# Patient Record
Sex: FEMALE | Race: WHITE
[De-identification: names, ages, dates, MRNs, and addresses within clinical notes are randomized per-mention and may not be internally consistent; named-entity substitution may affect disease eponyms.]

---

## 2017-04-25 ENCOUNTER — HOSPITAL ENCOUNTER (EMERGENCY)
Dept: HOSPITAL 62 - ER | Age: 42
LOS: 1 days | Discharge: LEFT BEFORE DISCHARGE | End: 2017-04-26
Payer: SELF-PAY

## 2017-04-25 VITALS — DIASTOLIC BLOOD PRESSURE: 92 MMHG | SYSTOLIC BLOOD PRESSURE: 161 MMHG

## 2017-04-25 DIAGNOSIS — Z53.21: Primary | ICD-10-CM

## 2017-05-11 ENCOUNTER — HOSPITAL ENCOUNTER (EMERGENCY)
Dept: HOSPITAL 62 - ER | Age: 42
LOS: 1 days | Discharge: HOME | End: 2017-05-12
Payer: SELF-PAY

## 2017-05-11 DIAGNOSIS — M62.830: Primary | ICD-10-CM

## 2017-05-11 DIAGNOSIS — M54.9: ICD-10-CM

## 2017-05-11 DIAGNOSIS — M54.6: ICD-10-CM

## 2017-05-11 PROCEDURE — 99283 EMERGENCY DEPT VISIT LOW MDM: CPT

## 2017-05-12 VITALS — SYSTOLIC BLOOD PRESSURE: 148 MMHG | DIASTOLIC BLOOD PRESSURE: 91 MMHG

## 2017-05-12 NOTE — ER DOCUMENT REPORT
ED Neck/Back Problem





- General


Chief Complaint: Back Pain


Stated Complaint: BACK PAIN


Time Seen by Provider: 05/12/17 00:21


Mode of Arrival: Ambulatory


Information source: Patient


Notes: 


Patient is a 41-year-old  female who presents to the ER today for a 

painful lump on the left side of her upper back 1 month.  Patient states that 

it is very painful and that she can no longer leaning against anything without 

pain.  She has not tried anything for the pain.  She denies any numbness or 

tingling, pain over the middle of her back.  The pain does not radiate 

anywhere.  She's had no fever, chills, redness, drainage.


TRAVEL OUTSIDE OF THE [place] IN LAST 30 DAYS: No





- Related Data


Allergies/Adverse Reactions: 


 





dextromethorphan HBr [From NyQuil] Allergy (Verified 05/11/17 22:09)


 


doxylamine [From NyQuil] Allergy (Verified 05/11/17 22:09)


 


pseudoephedrine HCl [From NyQuil] Allergy (Verified 05/11/17 22:09)


 











Past Medical History





- General


Information source: Patient





- Social History


Smoking Status: Unknown if Ever Smoked


Family History: Reviewed & Not Pertinent, CAD, Malignancy


Patient has suicidal ideation: No


Patient has homicidal ideation: No


Neurological Medical History: Reports: Hx Migraine


Renal/ Medical History: Denies: Hx Peritoneal Dialysis


Psychiatric Medical History: Reports: Hx Bipolar Disorder, Hx Depression


Past Surgical History: Reports: Hx Tubal Ligation





- Immunizations


Immunizations up to date: Yes


Hx Diphtheria, Pertussis, Tetanus Vaccination: Yes


Hx Pneumococcal Vaccination: 10/01/10





Review of Systems





- Review of Systems


Constitutional: No symptoms reported


EENT: No symptoms reported


Cardiovascular: No symptoms reported


Respiratory: No symptoms reported


Gastrointestinal: No symptoms reported


Genitourinary: No symptoms reported


Female Genitourinary: No symptoms reported


Musculoskeletal: See HPI


Skin: See HPI


Hematologic/Lymphatic: No symptoms reported


Neurological/Psychological: No symptoms reported





Physical Exam





- Vital signs


Vitals: 





 











Temp Pulse Resp BP Pulse Ox


 


 97.8 F   73   16   142/92 H  98 


 


 05/11/17 22:10  05/11/17 22:10  05/11/17 22:10  05/11/17 22:10  05/11/17 22:10














- Notes


Notes: 


PHYSICAL EXAMINATION: 


GENERAL: Well-appearing and in no acute distress. 


HEAD: Atraumatic, normocephalic. 


EYES: Pupils equal round and reactive to light, extraocular movements intact, 

sclera anicteric, conjunctiva are normal. 


ENT: ear canals without erythema or foreign body, TMs pearly grey with good 

bony landmarks, nares patent, oropharynx clear without exudates. Moist mucous 

membranes. 


NECK: Normal range of motion, supple without lymphadenopathy 


LUNGS: CTAB and equal. No wheezes rales or rhonchi. 


HEART: Regular rate and rhythm without murmurs


ABDOMEN: Soft, no tenderness. No guarding, no rebound 


BACK: right upper back with muscle knot, tender, no vertebral tenderness, 

normal ROM


GI/: no CVA tenderness


EXTREMITIES: Normal range of motion, no pitting edema. No cyanosis. 


NEUROLOGICAL: Cranial nerves grossly intact. Normal sensory/motor exams. 


PSYCH: Normal mood, normal affect. 


SKIN: Warm, Dry, normal turgor,no erythema, no rash, no fluctuance or abscess

















Course





- Vital Signs


Vital signs: 





 











Temp Pulse Resp BP Pulse Ox


 


 97.8 F   73   16   142/92 H  98 


 


 05/11/17 22:10  05/11/17 22:10  05/11/17 22:10  05/11/17 22:10  05/11/17 22:10














Discharge





- Discharge


Clinical Impression: 


 Muscle spasm





Condition: Stable


Disposition: HOME, SELF-CARE


Additional Instructions: 


Return immediately for any new or worsening symptoms.





Follow up with primary care provider, call tomorrow to make followup 

appointment.


Prescriptions: 


Methocarbamol [Robaxin 500 mg Tablet] 1,000 mg PO BID #40 tablet

## 2018-10-30 ENCOUNTER — HOSPITAL ENCOUNTER (EMERGENCY)
Dept: HOSPITAL 62 - ER | Age: 43
Discharge: HOME | End: 2018-10-30
Payer: COMMERCIAL

## 2018-10-30 VITALS — SYSTOLIC BLOOD PRESSURE: 148 MMHG | DIASTOLIC BLOOD PRESSURE: 91 MMHG

## 2018-10-30 DIAGNOSIS — S80.812A: Primary | ICD-10-CM

## 2018-10-30 DIAGNOSIS — S00.83XA: ICD-10-CM

## 2018-10-30 DIAGNOSIS — R51: ICD-10-CM
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
## 2018-10-30 DIAGNOSIS — F17.210: ICD-10-CM

## 2018-10-30 PROCEDURE — 72125 CT NECK SPINE W/O DYE: CPT

## 2018-10-30 PROCEDURE — 70486 CT MAXILLOFACIAL W/O DYE: CPT

## 2018-10-30 PROCEDURE — 70450 CT HEAD/BRAIN W/O DYE: CPT

## 2018-10-30 PROCEDURE — 99284 EMERGENCY DEPT VISIT MOD MDM: CPT
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
